# Patient Record
Sex: FEMALE | Race: WHITE | NOT HISPANIC OR LATINO | Employment: FULL TIME | URBAN - METROPOLITAN AREA
[De-identification: names, ages, dates, MRNs, and addresses within clinical notes are randomized per-mention and may not be internally consistent; named-entity substitution may affect disease eponyms.]

---

## 2017-09-14 ENCOUNTER — APPOINTMENT (OUTPATIENT)
Dept: LAB | Facility: HOSPITAL | Age: 32
End: 2017-09-14
Attending: INTERNAL MEDICINE

## 2017-09-14 ENCOUNTER — OFFICE VISIT (OUTPATIENT)
Dept: GASTROENTEROLOGY | Facility: CLINIC | Age: 32
End: 2017-09-14

## 2017-09-14 VITALS
WEIGHT: 171 LBS | DIASTOLIC BLOOD PRESSURE: 68 MMHG | BODY MASS INDEX: 30.3 KG/M2 | SYSTOLIC BLOOD PRESSURE: 102 MMHG | HEIGHT: 63 IN

## 2017-09-14 DIAGNOSIS — R14.0 BLOATING SYMPTOM: ICD-10-CM

## 2017-09-14 DIAGNOSIS — K90.0 CELIAC DISEASE: Primary | ICD-10-CM

## 2017-09-14 DIAGNOSIS — R11.0 NAUSEA: ICD-10-CM

## 2017-09-14 DIAGNOSIS — R10.84 GENERALIZED ABDOMINAL PAIN: ICD-10-CM

## 2017-09-14 LAB
ALBUMIN SERPL-MCNC: 4.3 G/DL (ref 3.5–5.2)
ALP SERPL-CCNC: 72 U/L (ref 40–129)
ALT SERPL W P-5'-P-CCNC: 625 U/L (ref 5–33)
AMYLASE SERPL-CCNC: 60 U/L (ref 28–100)
AST SERPL-CCNC: 201 U/L (ref 5–32)
BILIRUB CONJ SERPL-MCNC: <0.2 MG/DL (ref 0.2–0.3)
BILIRUB INDIRECT SERPL-MCNC: ABNORMAL MG/DL
BILIRUB SERPL-MCNC: 0.3 MG/DL (ref 0.2–1.2)
IRON 24H UR-MRATE: 125 MCG/DL (ref 37–145)
LIPASE SERPL-CCNC: 34 U/L (ref 13–60)
PROT SERPL-MCNC: 7.6 G/DL (ref 6–8.5)

## 2017-09-14 PROCEDURE — 83540 ASSAY OF IRON: CPT | Performed by: INTERNAL MEDICINE

## 2017-09-14 PROCEDURE — 82150 ASSAY OF AMYLASE: CPT | Performed by: INTERNAL MEDICINE

## 2017-09-14 PROCEDURE — 83516 IMMUNOASSAY NONANTIBODY: CPT | Performed by: INTERNAL MEDICINE

## 2017-09-14 PROCEDURE — 83690 ASSAY OF LIPASE: CPT | Performed by: INTERNAL MEDICINE

## 2017-09-14 PROCEDURE — 80076 HEPATIC FUNCTION PANEL: CPT | Performed by: INTERNAL MEDICINE

## 2017-09-14 PROCEDURE — 83013 H PYLORI (C-13) BREATH: CPT | Performed by: INTERNAL MEDICINE

## 2017-09-14 PROCEDURE — 99204 OFFICE O/P NEW MOD 45 MIN: CPT | Performed by: INTERNAL MEDICINE

## 2017-09-14 PROCEDURE — 36415 COLL VENOUS BLD VENIPUNCTURE: CPT | Performed by: INTERNAL MEDICINE

## 2017-09-14 PROCEDURE — 84630 ASSAY OF ZINC: CPT | Performed by: INTERNAL MEDICINE

## 2017-09-14 RX ORDER — SODIUM, POTASSIUM,MAG SULFATES 17.5-3.13G
1 SOLUTION, RECONSTITUTED, ORAL ORAL EVERY 12 HOURS
Qty: 2 BOTTLE | Refills: 0 | Status: SHIPPED | OUTPATIENT
Start: 2017-09-14 | End: 2019-03-20

## 2017-09-14 NOTE — PROGRESS NOTES
PATIENT INFORMATION  Wendy Chambers       - 1985    CHIEF COMPLAINT  Chief Complaint   Patient presents with   • Abdominal Pain   • Bloated   • Rectal Bleeding   • Diarrhea   • Nausea   • Heartburn       HISTORY OF PRESENT ILLNESS  Abdominal Pain   Associated symptoms include diarrhea, hematochezia and nausea. Her past medical history is significant for GERD.   Rectal Bleeding   Associated symptoms include abdominal pain and nausea.   Diarrhea    Associated symptoms include abdominal pain.   Nausea   Associated symptoms include abdominal pain and nausea.   Heartburn   She complains of abdominal pain and nausea.     32 yo diagnosed with celiac in . She has noticed a lot of bloating and gas and intermittent nausea since July. She feels similar to how she was prior to becoming ill. She is following a gluten free diet. Severity is moderate.  BM are with,h urgency with diarrhea and at times just urgency. BM are twice daily. She has noticed some blood on toilet paper only. She has history of hemorrhoids.  She does note that stools are lighter. BM are 2-3 times daily, no nocturnal diarrhea.   Abdominal pain is usually in the upper abdomen usually within minutes. She does note some worsening with lactose exposure.  Weight has increased by 20 pounds in 6-7 weeks.  Zoloft was started in April.    She was previously seen by another GI doctor previously.  REVIEW OF SYSTEMS  Review of Systems   Gastrointestinal: Positive for abdominal pain, diarrhea, hematochezia and nausea.         ACTIVE PROBLEMS  There are no active problems to display for this patient.        PAST MEDICAL HISTORY  Past Medical History:   Diagnosis Date   • Anxiety and depression    • Celiac disease          SURGICAL HISTORY  Past Surgical History:   Procedure Laterality Date   • ANAL FISSURECTOMY     • COLONOSCOPY     • TONSILLECTOMY           FAMILY HISTORY  Family History   Problem Relation Age of Onset   • Colon polyps Mother    • Colon  "polyps Maternal Grandfather          SOCIAL HISTORY  Social History     Occupational History   • Not on file.     Social History Main Topics   • Smoking status: Never Smoker   • Smokeless tobacco: Not on file   • Alcohol use Yes   • Drug use: Not on file   • Sexual activity: Not on file         CURRENT MEDICATIONS    Current Outpatient Prescriptions:   •  acyclovir (ZOVIRAX) 400 MG tablet, , Disp: , Rfl:   •  AMETHIA LO 0.1-0.02 & 0.01 MG tablet, , Disp: , Rfl:   •  estradiol (ESTRACE) 0.1 MG/GM vaginal cream, Insert 1 g into the vagina., Disp: , Rfl:   •  fluconazole (DIFLUCAN) 150 MG tablet, TAKE 1 TABLET (150 MG) BY MOUTH ONE TIME, Disp: , Rfl: 0  •  sertraline (ZOLOFT) 50 MG tablet, TAKE 1 TABLET BY MOUTH EVERY DAY, Disp: , Rfl: 0  •  sertraline (ZOLOFT) 50 MG tablet, , Disp: , Rfl:     ALLERGIES  Gluten meal    VITALS  Vitals:    09/14/17 1025   BP: 102/68   Weight: 171 lb (77.6 kg)   Height: 63\" (160 cm)       LAST RESULTS   No results found for any previous visit.  No results found.    PHYSICAL EXAM  Physical Exam   Constitutional: She is oriented to person, place, and time. She appears well-developed and well-nourished. No distress.   HENT:   Head: Normocephalic and atraumatic.   Mouth/Throat: Oropharynx is clear and moist.   Eyes: EOM are normal. Pupils are equal, round, and reactive to light.   Neck: Normal range of motion. No tracheal deviation present.   Cardiovascular: Normal rate, regular rhythm, normal heart sounds and intact distal pulses.  Exam reveals no gallop and no friction rub.    No murmur heard.  Pulmonary/Chest: Effort normal and breath sounds normal. No stridor. No respiratory distress. She has no wheezes. She has no rales. She exhibits no tenderness.   Abdominal: Soft. Bowel sounds are normal. She exhibits no distension. There is tenderness. There is no rebound and no guarding.   Mid abdominal pain, no rebound or guarding.   Musculoskeletal: She exhibits no edema.   Lymphadenopathy:     " She has no cervical adenopathy.   Neurological: She is alert and oriented to person, place, and time.   Skin: Skin is warm. She is not diaphoretic.   Psychiatric: She has a normal mood and affect. Her behavior is normal. Judgment and thought content normal.   Nursing note and vitals reviewed.      ASSESSMENT  Diagnoses and all orders for this visit:    Celiac disease  -     H. Pylori Breath Test  -     Hepatic Function Panel  -     Celiac Ab tTG DGP TIgA  -     Lipase  -     Amylase  -     Zinc  -     Iron  -     Stool Culture  -     Clostridium Difficile Toxin  -     Case Request; Standing  -     Case Request    Bloating symptom  -     H. Pylori Breath Test  -     Hepatic Function Panel  -     Celiac Ab tTG DGP TIgA  -     Lipase  -     Amylase  -     Zinc  -     Iron  -     Stool Culture  -     Clostridium Difficile Toxin  -     Case Request; Standing  -     Case Request    Generalized abdominal pain  -     H. Pylori Breath Test  -     Hepatic Function Panel  -     Celiac Ab tTG DGP TIgA  -     Lipase  -     Amylase  -     Zinc  -     Iron  -     Stool Culture  -     Clostridium Difficile Toxin  -     Case Request; Standing  -     Case Request    Nausea  -     H. Pylori Breath Test  -     Hepatic Function Panel  -     Celiac Ab tTG DGP TIgA  -     Lipase  -     Amylase  -     Zinc  -     Iron  -     Stool Culture  -     Clostridium Difficile Toxin  -     Case Request; Standing  -     Case Request    Other orders  -     Implement Anesthesia orders day of procedure.; Standing  -     Obtain informed consent; Standing          PLAN  No Follow-up on file.      Risks, benefits and alternatives discussed including but not limited to the complications of bleeding, perforation and sedation related problems.Risks, benefits and alternatives discussed including but not limited to the complications of bleeding, perforation and sedation related problems.        Will need, RUQ US, GES and SBIO

## 2017-09-15 DIAGNOSIS — R74.8 ELEVATED LIVER ENZYMES: Primary | ICD-10-CM

## 2017-09-15 LAB
GLIADIN PEPTIDE IGA SER-ACNC: 5 UNITS (ref 0–19)
GLIADIN PEPTIDE IGG SER-ACNC: 5 UNITS (ref 0–19)
IGA SERPL-MCNC: 122 MG/DL (ref 87–352)
TTG IGA SER-ACNC: <2 U/ML (ref 0–3)
TTG IGG SER-ACNC: <2 U/ML (ref 0–5)

## 2017-09-16 DIAGNOSIS — R79.89 ELEVATED LIVER FUNCTION TESTS: Primary | ICD-10-CM

## 2017-09-16 LAB — ZINC SERPL-MCNC: 71 UG/DL (ref 56–134)

## 2017-09-17 LAB — UREA BREATH TEST QL: NEGATIVE

## 2017-09-17 RX ORDER — ONDANSETRON 4 MG/1
4 TABLET, FILM COATED ORAL EVERY 8 HOURS PRN
Qty: 25 TABLET | Refills: 0 | Status: SHIPPED | OUTPATIENT
Start: 2017-09-17 | End: 2019-03-20

## 2017-09-18 ENCOUNTER — HOSPITAL ENCOUNTER (OUTPATIENT)
Dept: ULTRASOUND IMAGING | Facility: HOSPITAL | Age: 32
Discharge: HOME | End: 2017-09-18
Attending: INTERNAL MEDICINE | Admitting: INTERNAL MEDICINE

## 2017-09-18 ENCOUNTER — APPOINTMENT (OUTPATIENT)
Dept: LAB | Facility: HOSPITAL | Age: 32
End: 2017-09-18
Attending: INTERNAL MEDICINE

## 2017-09-18 DIAGNOSIS — R74.8 ELEVATED LIVER ENZYMES: ICD-10-CM

## 2017-09-18 LAB
BASOPHILS # BLD AUTO: 0.05 10*3/MM3 (ref 0–0.2)
BASOPHILS NFR BLD AUTO: 1 % (ref 0–2)
DEPRECATED RDW RBC AUTO: 44.7 FL (ref 37–54)
EOSINOPHIL # BLD AUTO: 0.24 10*3/MM3 (ref 0.1–0.3)
EOSINOPHIL NFR BLD AUTO: 4.6 % (ref 0–4)
ERYTHROCYTE [DISTWIDTH] IN BLOOD BY AUTOMATED COUNT: 13 % (ref 11.5–14.5)
HAV IGM SERPL QL IA: NORMAL
HBV CORE IGM SERPL QL IA: NORMAL
HBV SURFACE AG SERPL QL IA: NORMAL
HCT VFR BLD AUTO: 43.8 % (ref 37–47)
HCV AB SER DONR QL: NORMAL
HETEROPH AB SER QL LA: NEGATIVE
HGB BLD-MCNC: 14.3 G/DL (ref 12–16)
IMM GRANULOCYTES # BLD: 0.01 10*3/MM3 (ref 0–0.03)
IMM GRANULOCYTES NFR BLD: 0.2 % (ref 0–0.5)
INR PPP: 1.02 (ref 0.9–1.1)
LYMPHOCYTES # BLD AUTO: 1.44 10*3/MM3 (ref 0.6–4.8)
LYMPHOCYTES NFR BLD AUTO: 27.4 % (ref 20–45)
MCH RBC QN AUTO: 30.4 PG (ref 27–31)
MCHC RBC AUTO-ENTMCNC: 32.6 G/DL (ref 31–37)
MCV RBC AUTO: 93 FL (ref 81–99)
MONOCYTES # BLD AUTO: 0.32 10*3/MM3 (ref 0–1)
MONOCYTES NFR BLD AUTO: 6.1 % (ref 3–8)
NEUTROPHILS # BLD AUTO: 3.2 10*3/MM3 (ref 1.5–8.3)
NEUTROPHILS NFR BLD AUTO: 60.7 % (ref 45–70)
NRBC BLD MANUAL-RTO: 0 /100 WBC (ref 0–0)
PLATELET # BLD AUTO: 286 10*3/MM3 (ref 140–500)
PMV BLD AUTO: 10.4 FL (ref 7.4–10.4)
PROTHROMBIN TIME: 13.4 SECONDS (ref 12.1–15)
RBC # BLD AUTO: 4.71 10*6/MM3 (ref 4.2–5.4)
WBC NRBC COR # BLD: 5.26 10*3/MM3 (ref 4.8–10.8)

## 2017-09-18 PROCEDURE — 86308 HETEROPHILE ANTIBODY SCREEN: CPT | Performed by: INTERNAL MEDICINE

## 2017-09-18 PROCEDURE — 85610 PROTHROMBIN TIME: CPT | Performed by: INTERNAL MEDICINE

## 2017-09-18 PROCEDURE — 83516 IMMUNOASSAY NONANTIBODY: CPT | Performed by: INTERNAL MEDICINE

## 2017-09-18 PROCEDURE — 85025 COMPLETE CBC W/AUTO DIFF WBC: CPT | Performed by: INTERNAL MEDICINE

## 2017-09-18 PROCEDURE — 86038 ANTINUCLEAR ANTIBODIES: CPT | Performed by: INTERNAL MEDICINE

## 2017-09-18 PROCEDURE — 36415 COLL VENOUS BLD VENIPUNCTURE: CPT | Performed by: INTERNAL MEDICINE

## 2017-09-18 PROCEDURE — 76705 ECHO EXAM OF ABDOMEN: CPT

## 2017-09-18 PROCEDURE — 80074 ACUTE HEPATITIS PANEL: CPT | Performed by: INTERNAL MEDICINE

## 2017-09-19 LAB
ACTIN IGG SERPL-ACNC: 4 UNITS (ref 0–19)
ANA SER QL: NEGATIVE
DEPRECATED MITOCHONDRIA M2 IGG SER-ACNC: <20 UNITS (ref 0–20)

## 2017-09-20 ENCOUNTER — TRANSCRIBE ORDERS (OUTPATIENT)
Dept: ADMINISTRATIVE | Facility: HOSPITAL | Age: 32
End: 2017-09-20

## 2017-09-20 ENCOUNTER — LAB (OUTPATIENT)
Dept: LAB | Facility: HOSPITAL | Age: 32
End: 2017-09-20
Attending: INTERNAL MEDICINE

## 2017-09-20 DIAGNOSIS — R19.7 DIARRHEA, UNSPECIFIED TYPE: Primary | ICD-10-CM

## 2017-09-20 DIAGNOSIS — R79.89 ELEVATED LFTS: Primary | ICD-10-CM

## 2017-09-20 DIAGNOSIS — R19.7 DIARRHEA, UNSPECIFIED TYPE: ICD-10-CM

## 2017-09-20 PROCEDURE — 87209 SMEAR COMPLEX STAIN: CPT | Performed by: INTERNAL MEDICINE

## 2017-09-20 PROCEDURE — 87329 GIARDIA AG IA: CPT | Performed by: INTERNAL MEDICINE

## 2017-09-20 PROCEDURE — 87177 OVA AND PARASITES SMEARS: CPT | Performed by: INTERNAL MEDICINE

## 2017-09-22 ENCOUNTER — APPOINTMENT (OUTPATIENT)
Dept: LAB | Facility: HOSPITAL | Age: 32
End: 2017-09-22
Attending: INTERNAL MEDICINE

## 2017-09-22 LAB
ALBUMIN SERPL-MCNC: 4.3 G/DL (ref 3.5–5.2)
ALP SERPL-CCNC: 83 U/L (ref 39–117)
ALT SERPL W P-5'-P-CCNC: 371 U/L (ref 1–33)
AST SERPL-CCNC: 135 U/L (ref 1–32)
BILIRUB CONJ SERPL-MCNC: <0.2 MG/DL (ref 0–0.3)
BILIRUB INDIRECT SERPL-MCNC: ABNORMAL MG/DL
BILIRUB SERPL-MCNC: 0.3 MG/DL (ref 0.1–1.2)
G LAMBLIA AG STL QL IA: NEGATIVE
PROT SERPL-MCNC: 6.7 G/DL (ref 6–8.5)

## 2017-09-22 PROCEDURE — 36415 COLL VENOUS BLD VENIPUNCTURE: CPT

## 2017-09-22 PROCEDURE — 80076 HEPATIC FUNCTION PANEL: CPT | Performed by: INTERNAL MEDICINE

## 2017-09-25 LAB
O+P SPEC MICRO: NORMAL
OVA + PARASITE RESULT 1: NORMAL

## 2017-09-26 ENCOUNTER — TELEPHONE (OUTPATIENT)
Dept: GASTROENTEROLOGY | Facility: CLINIC | Age: 32
End: 2017-09-26

## 2017-10-04 ENCOUNTER — APPOINTMENT (OUTPATIENT)
Dept: LAB | Facility: HOSPITAL | Age: 32
End: 2017-10-04

## 2017-10-04 LAB — C DIFF TOX GENS STL QL NAA+PROBE: NEGATIVE

## 2017-10-04 PROCEDURE — 87493 C DIFF AMPLIFIED PROBE: CPT | Performed by: INTERNAL MEDICINE

## 2017-10-04 PROCEDURE — 87046 STOOL CULTR AEROBIC BACT EA: CPT | Performed by: INTERNAL MEDICINE

## 2017-10-04 PROCEDURE — 87899 AGENT NOS ASSAY W/OPTIC: CPT | Performed by: INTERNAL MEDICINE

## 2017-10-04 PROCEDURE — 87045 FECES CULTURE AEROBIC BACT: CPT | Performed by: INTERNAL MEDICINE

## 2017-10-06 LAB
BACTERIA SPEC AEROBE CULT: NORMAL
BACTERIA SPEC AEROBE CULT: NORMAL

## 2017-11-14 ENCOUNTER — OUTSIDE FACILITY SERVICE (OUTPATIENT)
Dept: GASTROENTEROLOGY | Facility: CLINIC | Age: 32
End: 2017-11-14

## 2017-11-14 ENCOUNTER — LAB REQUISITION (OUTPATIENT)
Dept: LAB | Facility: HOSPITAL | Age: 32
End: 2017-11-14

## 2017-11-14 DIAGNOSIS — K90.0 CELIAC DISEASE: ICD-10-CM

## 2017-11-14 PROCEDURE — 88312 SPECIAL STAINS GROUP 1: CPT | Performed by: INTERNAL MEDICINE

## 2017-11-14 PROCEDURE — 45380 COLONOSCOPY AND BIOPSY: CPT | Performed by: INTERNAL MEDICINE

## 2017-11-14 PROCEDURE — 43239 EGD BIOPSY SINGLE/MULTIPLE: CPT | Performed by: INTERNAL MEDICINE

## 2017-11-14 PROCEDURE — 88305 TISSUE EXAM BY PATHOLOGIST: CPT | Performed by: INTERNAL MEDICINE

## 2017-11-16 LAB
CYTO UR: NORMAL
LAB AP CASE REPORT: NORMAL
LAB AP CLINICAL INFORMATION: NORMAL
Lab: NORMAL
PATH REPORT.FINAL DX SPEC: NORMAL
PATH REPORT.GROSS SPEC: NORMAL

## 2017-12-28 ENCOUNTER — OFFICE VISIT (OUTPATIENT)
Dept: GASTROENTEROLOGY | Facility: CLINIC | Age: 32
End: 2017-12-28

## 2017-12-28 VITALS
BODY MASS INDEX: 33.38 KG/M2 | SYSTOLIC BLOOD PRESSURE: 106 MMHG | HEIGHT: 63 IN | WEIGHT: 188.4 LBS | DIASTOLIC BLOOD PRESSURE: 68 MMHG

## 2017-12-28 DIAGNOSIS — K90.0 CELIAC DISEASE: Primary | ICD-10-CM

## 2017-12-28 DIAGNOSIS — R74.8 ELEVATED LIVER ENZYMES: ICD-10-CM

## 2017-12-28 PROCEDURE — 99213 OFFICE O/P EST LOW 20 MIN: CPT | Performed by: INTERNAL MEDICINE

## 2017-12-28 NOTE — PROGRESS NOTES
PATIENT INFORMATION  Wendy Chambers       - 1985    CHIEF COMPLAINT  Chief Complaint   Patient presents with   • Celiac Disease     FOLLOW UP ON EGD AND C/S       HISTORY OF PRESENT ILLNESS  Celiac Disease       She is here today in regards to elevated liver enzymes and follow up from egd and cls. She had recent labs at pcp and recalls numbers coming down to 62/77 previously was 117/158 in November. Pathology is reviewed with her. Gastritis with no h.pylori. Weight has been stable/slowly increasing.   bm are normal formed stools.    REVIEW OF SYSTEMS  Review of Systems   Constitutional: Positive for unexpected weight change.   Genitourinary: Positive for frequency.   All other systems reviewed and are negative.        ACTIVE PROBLEMS  Patient Active Problem List    Diagnosis   • Celiac disease [K90.0]         PAST MEDICAL HISTORY  Past Medical History:   Diagnosis Date   • Anxiety and depression    • Celiac disease          SURGICAL HISTORY  Past Surgical History:   Procedure Laterality Date   • ANAL FISSURECTOMY     • COLONOSCOPY     • TONSILLECTOMY           FAMILY HISTORY  Family History   Problem Relation Age of Onset   • Colon polyps Mother    • Colon polyps Maternal Grandfather          SOCIAL HISTORY  Social History     Occupational History   • Not on file.     Social History Main Topics   • Smoking status: Never Smoker   • Smokeless tobacco: Not on file   • Alcohol use Yes   • Drug use: Not on file   • Sexual activity: Not on file         CURRENT MEDICATIONS    Current Outpatient Prescriptions:   •  acyclovir (ZOVIRAX) 400 MG tablet, , Disp: , Rfl:   •  AMETHIA LO 0.1-0.02 & 0.01 MG tablet, , Disp: , Rfl:   •  estradiol (ESTRACE) 0.1 MG/GM vaginal cream, Insert 1 g into the vagina., Disp: , Rfl:   •  fluconazole (DIFLUCAN) 150 MG tablet, TAKE 1 TABLET (150 MG) BY MOUTH ONE TIME, Disp: , Rfl: 0  •  ondansetron (ZOFRAN) 4 MG tablet, Take 1 tablet by mouth Every 8 (Eight) Hours As Needed for  "Nausea or Vomiting., Disp: 25 tablet, Rfl: 0  •  sertraline (ZOLOFT) 50 MG tablet, TAKE 1 TABLET BY MOUTH EVERY DAY, Disp: , Rfl: 0  •  sertraline (ZOLOFT) 50 MG tablet, , Disp: , Rfl:   •  sodium-potassium-magnesium sulfates (SUPREP BOWEL PREP KIT) 17.5-3.13-1.6 GM/180ML solution oral solution, Take 1 bottle by mouth Every 12 (Twelve) Hours., Disp: 2 bottle, Rfl: 0    ALLERGIES  Gluten meal    VITALS  Vitals:    12/28/17 1020   BP: 106/68   Weight: 85.5 kg (188 lb 6.4 oz)   Height: 160 cm (62.99\")       LAST RESULTS   Lab Requisition on 11/14/2017   Component Date Value Ref Range Status   • Case Report 11/14/2017    Final                    Value:Surgical Pathology Report                         Case: [id number]                                  Authorizing Provider:  Yessenia Bryant MD          Collected:           11/14/2017 09:30 AM          Pathologist:           Hima Grayson MD        Received:            11/14/2017 10:18 PM          Specimens:   1) - Small Intestine, small bowel bx                                                                2) - Gastric, Body, gastric bx                                                                      3) - Colon, random colon bx                                                               • Clinical Information 11/14/2017    Final                    Value:This result contains rich text formatting which cannot be displayed here.   • Final Diagnosis 11/14/2017    Final                    Value:This result contains rich text formatting which cannot be displayed here.   • Gross Description 11/14/2017    Final                    Value:This result contains rich text formatting which cannot be displayed here.   • Microscopic Description 11/14/2017    Final                    Value:This result contains rich text formatting which cannot be displayed here.     No results found.    PHYSICAL EXAM  Physical Exam   Constitutional: She is oriented to person, place, and time. She " appears well-developed and well-nourished. No distress.   HENT:   Head: Normocephalic and atraumatic.   Mouth/Throat: Oropharynx is clear and moist.   Eyes: EOM are normal. Pupils are equal, round, and reactive to light.   Neck: Normal range of motion. No tracheal deviation present.   Cardiovascular: Normal rate, regular rhythm, normal heart sounds and intact distal pulses.  Exam reveals no gallop and no friction rub.    No murmur heard.  Pulmonary/Chest: Effort normal and breath sounds normal. No stridor. No respiratory distress. She has no wheezes. She has no rales. She exhibits no tenderness.   Abdominal: Soft. Bowel sounds are normal. She exhibits no distension. There is no tenderness. There is no rebound and no guarding.   Musculoskeletal: She exhibits no edema.   Lymphadenopathy:     She has no cervical adenopathy.   Neurological: She is alert and oriented to person, place, and time.   Skin: Skin is warm. She is not diaphoretic.   Psychiatric: She has a normal mood and affect. Her behavior is normal. Judgment and thought content normal.   Nursing note and vitals reviewed.      ASSESSMENT  Diagnoses and all orders for this visit:    Celiac disease    Elevated liver enzymes          PLAN  No Follow-up on file.    Will recheck liver enzymes one month from last. She plans to restart birth control if numbers are normal. There is no plans to restart zoloft which I agree.

## 2019-01-31 ENCOUNTER — TRANSCRIBE ORDERS (OUTPATIENT)
Dept: ADMINISTRATIVE | Facility: HOSPITAL | Age: 34
End: 2019-01-31

## 2019-01-31 DIAGNOSIS — R79.89 ELEVATED LFTS: Primary | ICD-10-CM

## 2019-01-31 DIAGNOSIS — R10.10 UPPER ABDOMINAL PAIN: ICD-10-CM

## 2019-02-01 ENCOUNTER — APPOINTMENT (OUTPATIENT)
Dept: ULTRASOUND IMAGING | Facility: HOSPITAL | Age: 34
End: 2019-02-01
Attending: INTERNAL MEDICINE

## 2019-02-04 ENCOUNTER — HOSPITAL ENCOUNTER (OUTPATIENT)
Dept: ULTRASOUND IMAGING | Facility: HOSPITAL | Age: 34
Discharge: HOME | End: 2019-02-04
Attending: INTERNAL MEDICINE | Admitting: INTERNAL MEDICINE

## 2019-02-04 DIAGNOSIS — R79.89 ELEVATED LFTS: ICD-10-CM

## 2019-02-04 DIAGNOSIS — R10.10 UPPER ABDOMINAL PAIN: ICD-10-CM

## 2019-02-04 PROCEDURE — 76705 ECHO EXAM OF ABDOMEN: CPT

## 2019-03-20 ENCOUNTER — OFFICE VISIT (OUTPATIENT)
Dept: SURGERY | Facility: CLINIC | Age: 34
End: 2019-03-20

## 2019-03-20 VITALS
TEMPERATURE: 97.9 F | SYSTOLIC BLOOD PRESSURE: 118 MMHG | WEIGHT: 186.8 LBS | HEIGHT: 63 IN | OXYGEN SATURATION: 98 % | DIASTOLIC BLOOD PRESSURE: 78 MMHG | BODY MASS INDEX: 33.1 KG/M2 | HEART RATE: 86 BPM

## 2019-03-20 DIAGNOSIS — K60.2 ANAL FISSURE: Primary | ICD-10-CM

## 2019-03-20 PROBLEM — N90.89 VULVAR FISSURE: Status: ACTIVE | Noted: 2018-04-20

## 2019-03-20 PROBLEM — B00.9 HERPES SIMPLEX: Status: ACTIVE | Noted: 2017-04-14

## 2019-03-20 PROBLEM — E07.9 DISEASE OF THYROID GLAND: Status: ACTIVE | Noted: 2019-03-20

## 2019-03-20 PROBLEM — N91.2 ABSENCE OF MENSTRUATION: Status: ACTIVE | Noted: 2019-03-20

## 2019-03-20 PROBLEM — L90.0 LICHEN SCLEROSUS: Status: ACTIVE | Noted: 2018-05-25

## 2019-03-20 PROBLEM — H69.80 DYSFUNCTION OF EUSTACHIAN TUBE: Status: ACTIVE | Noted: 2019-03-20

## 2019-03-20 PROBLEM — M79.673 FOOT PAIN: Status: ACTIVE | Noted: 2019-03-20

## 2019-03-20 PROBLEM — E28.2 BILATERAL POLYCYSTIC OVARIAN SYNDROME: Status: ACTIVE | Noted: 2019-03-20

## 2019-03-20 PROBLEM — R53.83 FATIGUE: Status: ACTIVE | Noted: 2019-03-20

## 2019-03-20 PROBLEM — R51.9 CEPHALALGIA: Status: ACTIVE | Noted: 2019-03-20

## 2019-03-20 PROCEDURE — 99244 OFF/OP CNSLTJ NEW/EST MOD 40: CPT | Performed by: COLON & RECTAL SURGERY

## 2019-03-20 RX ORDER — ESTRADIOL 0.1 MG/G
1 CREAM VAGINAL 3 TIMES WEEKLY
COMMUNITY
Start: 2017-09-11 | End: 2019-03-20

## 2019-03-20 RX ORDER — BUPROPION HYDROCHLORIDE 150 MG/1
150 TABLET ORAL EVERY MORNING
Refills: 0 | COMMUNITY
Start: 2019-01-13 | End: 2019-03-20

## 2019-03-20 NOTE — PROGRESS NOTES
Wendy Chambers is a 33 y.o. female who is seen as a consult at the request of Marisa Timmons MD for anal pain    HPI:    Pt states symptoms began with nausea and abdominal cramping in December  Then she started having anal pain in January  Severe pain during and after BMs, interfering with her daily life    She usually has 2-3 BMs per day  Stool consistency sometimes formed, sometimes loose  She feels that stools are narrower for about the past 2 months    She is also noting blood almost every day with BMs    She does not take any stool softeners or fiber supplements    She has been using PrepH which helps a little  Also using Tucks pads, but she does not think this is helping    She also feels that she has some seepage a couple of hours after a BM    She had anal fissure surgery about , possibly Dr. Chanel.  She does not know if she had an incision to the sphincter muscle  She states the pain now is worse than when she had fissure    Most recent colonoscopy with Dr. Bryant     No hx abnl Pap  2 vaginal deliveries, first with perineal 3* lac, 2nd with episiotomy    Past Medical History:   Diagnosis Date   • Alopecia areata    • Anal fissure    • Anxiety    • Asymmetrical thyroid 2014   • Celiac disease    • Depression    • Dysuria    • Elevated LFTs    • Fatigue    • GERD (gastroesophageal reflux disease)    • Gestational diabetes    • Hemorrhoids    • Herpes simplex    • Lichen sclerosus 10/2018   • Liver disease    • Oligohydramnios 2012   • PCOS (polycystic ovarian syndrome)    • Rectal bleeding 2017   • SAB (spontaneous ) 2011     A1   • Situational stress 10/29/2014   • Vertigo 2018    SEEN AT [facility] ER   • Vulvar fissure 10/2018       Past Surgical History:   Procedure Laterality Date   • ANAL FISSURECTOMY N/A    • COLONOSCOPY N/A 2017    INTERNAL HEMORRHOIDS, HYPERTROPHIED ANAL PAPILLA, DR. PRASANTH BRYANT AT [facility]   • DILATATION AND  CURETTAGE N/A 02/14/2011    [facility]   • ENDOSCOPY N/A 11/14/2017    GASTRITIS, DR. PRASANTH BARRETT AT [facility]   • INTRAUTERINE DEVICE INSERTION N/A 05/24/2018    DR. ANJELICA PABLO   • TONSILLECTOMY Bilateral 2006    DR. VERONICA   • VAGINAL DELIVERY N/A 02/26/2016    DR. ANJELICA PABLO AT [facility]   • VAGINAL DELIVERY N/A 09/14/2012    DR. ANJELICA PABLO AT [facility]       Social History:   reports that she has never smoked. She has never used smokeless tobacco. She reports that she drinks alcohol. Drug use questions deferred to the physician.      Marriage status:     Family History   Problem Relation Age of Onset   • Colon polyps Mother    • Hyperlipidemia Mother    • Colon polyps Maternal Grandfather    • Hyperlipidemia Father    • No Known Problems Son    • Ovarian cancer Paternal Aunt    • No Known Problems Son          Current Outpatient Medications:   •  Levonorgestrel (LILETTA, 52 MG,) 19.5 MCG/DAY intrauterine device, 1 each by Intrauterine route., Disp: , Rfl:   •  sertraline (ZOLOFT) 50 MG tablet, TAKE 1 TABLET BY MOUTH EVERY DAY, Disp: , Rfl: 0    Allergy  Gluten meal and Penicillins    Review of Systems   Constitution: Positive for weight gain. Negative for decreased appetite and weakness.   HENT: Negative for congestion, hearing loss and hoarse voice.    Eyes: Negative for blurred vision, discharge and visual disturbance.   Cardiovascular: Negative for chest pain, cyanosis and leg swelling.   Respiratory: Positive for snoring. Negative for cough, shortness of breath and sleep disturbances due to breathing.    Endocrine: Negative for cold intolerance and heat intolerance.   Hematologic/Lymphatic: Bruises/bleeds easily.   Skin: Negative for itching, poor wound healing and skin cancer.   Musculoskeletal: Negative for arthritis, back pain, joint pain and joint swelling.   Gastrointestinal: Positive for abdominal pain, change in bowel habit and bowel incontinence. Negative for constipation.    Genitourinary: Positive for bladder incontinence. Negative for dysuria and hematuria.   Neurological: Positive for headaches. Negative for brief paralysis, excessive daytime sleepiness, dizziness, focal weakness and light-headedness.   Psychiatric/Behavioral: Negative for altered mental status and hallucinations. The patient does not have insomnia.    Allergic/Immunologic: Negative for HIV exposure and persistent infections.   All other systems reviewed and are negative.      Vitals:    03/20/19 1441   BP: 118/78   Pulse: 86   Temp: 97.9 °F (36.6 °C)   SpO2: 98%     Body mass index is 33.09 kg/m².    Physical Exam   Constitutional: She is oriented to person, place, and time. She appears well-developed and well-nourished. No distress.   HENT:   Head: Normocephalic and atraumatic.   Nose: Nose normal.   Mouth/Throat: Oropharynx is clear and moist.   Eyes: Conjunctivae and EOM are normal. Pupils are equal, round, and reactive to light.   Neck: Normal range of motion. No tracheal deviation present.   Pulmonary/Chest: Effort normal and breath sounds normal. No respiratory distress.   Abdominal: Soft. Bowel sounds are normal. She exhibits no distension.   Genitourinary:   Genitourinary Comments: Perianal exam: external : +anterior fissure   Musculoskeletal: Normal range of motion. She exhibits no edema or deformity.   Neurological: She is alert and oriented to person, place, and time. No cranial nerve deficit. Coordination and gait normal.   Skin: Skin is warm and dry.   Psychiatric: She has a normal mood and affect. Her behavior is normal. Judgment normal.       Review of Medical Record:  I reviewed PCP Dr. Timmons records: pt with anal pain    Assessment:  1. Anal fissure        Plan:    I discussed with patient options on treating fissure: lateral internal anal sphincterotomy, chemical sphincterotomy with Botox, or topical creams of nitroglycerin, diltiazem, or nifedipine.  I discussed risks and benefits of all.   Risks of the lateral internal anal sphincterotomy are small chance of fecal incontinence, slow wound healing, and it is an invasive procedure versus the other 2 options, but the benefit is 97% success in fixing a fissure.  Chemical sphincterotomy has the benefit of no permanent fecal incontinence but a 80-85% success rate.  The topical creams have no incontinence risk, but are slow to heal the fissure and are only 80% successful.    I wrote for diltiazem and lidocaine cream to be placed on the anus 3 times a day.  I recommended taking MiraLAX and fiber daily.  I gave out written instructions.     Requesting 2010 Dr. Chanel records from storage.  She is also going to look at home for op records    Can consider colonoscopy if symptoms do not improve    Call or come in if symptoms worsen or do not improve      RTC 5 weeks      Scribed for Bailee Myers MD by Delma Munoz PA-C 3/20/2019  This patient was evaluated by me, recommendations made, documentation reviewed, edited, and revised by me, Bailee Myers MD

## 2019-04-23 ENCOUNTER — OFFICE VISIT (OUTPATIENT)
Dept: SURGERY | Facility: CLINIC | Age: 34
End: 2019-04-23

## 2019-04-23 VITALS
BODY MASS INDEX: 32.71 KG/M2 | HEART RATE: 122 BPM | SYSTOLIC BLOOD PRESSURE: 110 MMHG | HEIGHT: 63 IN | OXYGEN SATURATION: 98 % | DIASTOLIC BLOOD PRESSURE: 70 MMHG | TEMPERATURE: 98.2 F | WEIGHT: 184.6 LBS

## 2019-04-23 DIAGNOSIS — K60.2 ANAL FISSURE: Primary | ICD-10-CM

## 2019-04-23 PROCEDURE — 99212 OFFICE O/P EST SF 10 MIN: CPT | Performed by: COLON & RECTAL SURGERY

## 2019-04-23 RX ORDER — LISDEXAMFETAMINE DIMESYLATE 30 MG/1
30 CAPSULE ORAL EVERY MORNING
Refills: 0 | COMMUNITY
Start: 2019-03-26 | End: 2021-08-20

## 2019-04-23 NOTE — PROGRESS NOTES
"Wendy Chambers is a 33 y.o. female in for follow up of Anal fissure    Pt states her bottom is feeling much better since last visit  She notes blood only rarely    She is using dilt/lido prn  She also occasionally uses calmoseptine for perianal irritation    She is taking fiber gummies    /70 (BP Location: Left arm, Patient Position: Sitting, Cuff Size: Adult)   Pulse (!) 122   Temp 98.2 °F (36.8 °C) (Oral)   Ht 160 cm (63\")   Wt 83.7 kg (184 lb 9.6 oz)   SpO2 98%   BMI 32.70 kg/m²   Body mass index is 32.7 kg/m².      PE:  Physical Exam   Constitutional: She appears well-developed. No distress.   HENT:   Head: Normocephalic and atraumatic.   Abdominal: Soft. She exhibits no distension.   Musculoskeletal: Normal range of motion.   Neurological: She is alert.   Psychiatric: Thought content normal.         Assessment:   1. Anal fissure         Plan:    Her symptoms have greatly improved with conservative medical management.  Continue daily fiber supplement and prn stool softener.  She should use dilt/lido cream for 2 weeks past resolution of symptoms.    Continue colonoscopies per Dr. Bryant.  Call or come in if any questions or concerns.      RTC prn      Scribed for Bailee Myers MD by Delma Munoz PA-C 4/23/2019  This patient was evaluated by me, recommendations made, documentation reviewed, edited, and revised by me, Bailee Myers MD        "

## 2021-08-20 ENCOUNTER — OFFICE VISIT (OUTPATIENT)
Dept: SURGERY | Facility: CLINIC | Age: 36
End: 2021-08-20

## 2021-08-20 VITALS
BODY MASS INDEX: 40.18 KG/M2 | DIASTOLIC BLOOD PRESSURE: 60 MMHG | SYSTOLIC BLOOD PRESSURE: 110 MMHG | WEIGHT: 226.8 LBS | HEART RATE: 90 BPM | OXYGEN SATURATION: 97 % | HEIGHT: 63 IN | TEMPERATURE: 97.5 F

## 2021-08-20 DIAGNOSIS — K64.4 EXTERNAL HEMORRHOID: Primary | ICD-10-CM

## 2021-08-20 PROBLEM — Z97.5 IUD CONTRACEPTION: Status: ACTIVE | Noted: 2019-06-27

## 2021-08-20 PROCEDURE — 99213 OFFICE O/P EST LOW 20 MIN: CPT | Performed by: COLON & RECTAL SURGERY

## 2021-08-20 RX ORDER — DEXTROAMPHETAMINE SACCHARATE, AMPHETAMINE ASPARTATE MONOHYDRATE, DEXTROAMPHETAMINE SULFATE AND AMPHETAMINE SULFATE 3.75; 3.75; 3.75; 3.75 MG/1; MG/1; MG/1; MG/1
15 CAPSULE, EXTENDED RELEASE ORAL DAILY
COMMUNITY
Start: 2021-07-22

## 2021-08-20 RX ORDER — MELATONIN
1000 DAILY
COMMUNITY

## 2021-08-20 RX ORDER — SERTRALINE HYDROCHLORIDE 100 MG/1
TABLET, FILM COATED ORAL
COMMUNITY
Start: 2021-07-12 | End: 2021-08-20

## 2021-08-20 RX ORDER — BUPROPION HYDROCHLORIDE 150 MG/1
150 TABLET ORAL DAILY
COMMUNITY

## 2021-08-20 NOTE — PROGRESS NOTES
"Wendy Chambers is a 35 y.o. female in for follow up of External hemorrhoid    Pt concerned for a hemorrhoid x2 weeks.   Says area is hard.   Was painful, but pain resolved with sitz baths, Tucks pads, and SS.   Taking Fiber Capsules.   No RB  Denies any constipation.   FHx of colon polyps. No FHx of colon cancer.     /60 (BP Location: Left arm, Patient Position: Sitting, Cuff Size: Large Adult)   Pulse 90   Temp 97.5 °F (36.4 °C) (Temporal)   Ht 160 cm (63\")   Wt 103 kg (226 lb 12.8 oz)   LMP  (LMP Unknown)   SpO2 97%   Breastfeeding No   BMI 40.18 kg/m²   Body mass index is 40.18 kg/m².      PE:  Physical Exam  Constitutional:       General: She is not in acute distress.     Appearance: She is well-developed.   HENT:      Head: Normocephalic and atraumatic.   Abdominal:      General: There is no distension.      Palpations: Abdomen is soft.   Genitourinary:     Comments: Perianal exam: Small, soft, non-thrombosed external hemorrhoid located posteriorly    Musculoskeletal:         General: Normal range of motion.   Neurological:      Mental Status: She is alert.   Psychiatric:         Thought Content: Thought content normal.       Assessment:   1. External hemorrhoid       Plan:  - Soft, non-thrombosed external hemorrhoid on today's physical exam. Does not require surgical intervention at this time.   - Continue Sitz baths and/or cold compresses prn for rectal discomfort   - Recommended OTC Lidocaine Cream. Use ever 3-4 hours prn for pain  - Given FHx of colon polyps, recommended screening colonoscopy starting at age 40.   - Follow up prn for any new or worsening symptoms      Scribed for Bailee Myers MD by Alejandrina Lopez PA-C 8/20/2021  09:25 EDT   This patient was evaluated by me, recommendations made, documentation reviewed, edited, and revised by me, Bailee Myers MD        "